# Patient Record
Sex: MALE | Race: WHITE | Employment: PART TIME | URBAN - METROPOLITAN AREA
[De-identification: names, ages, dates, MRNs, and addresses within clinical notes are randomized per-mention and may not be internally consistent; named-entity substitution may affect disease eponyms.]

---

## 2021-08-01 ENCOUNTER — HOSPITAL ENCOUNTER (EMERGENCY)
Age: 56
Discharge: HOME | End: 2021-08-01
Attending: EMERGENCY MEDICINE
Payer: MEDICARE

## 2021-08-01 ENCOUNTER — APPOINTMENT (OUTPATIENT)
Dept: CT IMAGING | Age: 56
End: 2021-08-01
Attending: NURSE PRACTITIONER
Payer: MEDICARE

## 2021-08-01 VITALS
DIASTOLIC BLOOD PRESSURE: 75 MMHG | TEMPERATURE: 98.4 F | HEIGHT: 72 IN | BODY MASS INDEX: 33.77 KG/M2 | HEART RATE: 78 BPM | WEIGHT: 249.34 LBS | SYSTOLIC BLOOD PRESSURE: 146 MMHG | RESPIRATION RATE: 16 BRPM | OXYGEN SATURATION: 99 %

## 2021-08-01 DIAGNOSIS — R53.1 WEAKNESS: Primary | ICD-10-CM

## 2021-08-01 LAB
ALBUMIN SERPL-MCNC: 3.6 G/DL (ref 3.5–5)
ALBUMIN/GLOB SERPL: 0.9 {RATIO} (ref 1.1–2.2)
ALP SERPL-CCNC: 88 U/L (ref 45–117)
ALT SERPL-CCNC: 79 U/L (ref 12–78)
ANION GAP SERPL CALC-SCNC: 6 MMOL/L (ref 5–15)
APPEARANCE UR: CLEAR
AST SERPL-CCNC: 82 U/L (ref 15–37)
ATRIAL RATE: 66 BPM
BACTERIA URNS QL MICRO: NEGATIVE /HPF
BASOPHILS # BLD: 0.1 K/UL (ref 0–0.1)
BASOPHILS NFR BLD: 1 % (ref 0–1)
BILIRUB SERPL-MCNC: 0.4 MG/DL (ref 0.2–1)
BILIRUB UR QL: NEGATIVE
BUN SERPL-MCNC: 13 MG/DL (ref 6–20)
BUN/CREAT SERPL: 15 (ref 12–20)
CALCIUM SERPL-MCNC: 9 MG/DL (ref 8.5–10.1)
CALCULATED P AXIS, ECG09: 71 DEGREES
CALCULATED R AXIS, ECG10: 49 DEGREES
CALCULATED T AXIS, ECG11: 43 DEGREES
CHLORIDE SERPL-SCNC: 104 MMOL/L (ref 97–108)
CO2 SERPL-SCNC: 27 MMOL/L (ref 21–32)
COLOR UR: NORMAL
CREAT SERPL-MCNC: 0.86 MG/DL (ref 0.7–1.3)
DIAGNOSIS, 93000: NORMAL
DIFFERENTIAL METHOD BLD: ABNORMAL
EOSINOPHIL # BLD: 0.3 K/UL (ref 0–0.4)
EOSINOPHIL NFR BLD: 3 % (ref 0–7)
EPITH CASTS URNS QL MICRO: NORMAL /LPF
ERYTHROCYTE [DISTWIDTH] IN BLOOD BY AUTOMATED COUNT: 14.6 % (ref 11.5–14.5)
ETHANOL SERPL-MCNC: <10 MG/DL
GLOBULIN SER CALC-MCNC: 4 G/DL (ref 2–4)
GLUCOSE SERPL-MCNC: 150 MG/DL (ref 65–100)
GLUCOSE UR STRIP.AUTO-MCNC: NEGATIVE MG/DL
HCT VFR BLD AUTO: 38.9 % (ref 36.6–50.3)
HGB BLD-MCNC: 12.9 G/DL (ref 12.1–17)
HGB UR QL STRIP: NEGATIVE
IMM GRANULOCYTES # BLD AUTO: 0 K/UL (ref 0–0.04)
IMM GRANULOCYTES NFR BLD AUTO: 0 % (ref 0–0.5)
KETONES UR QL STRIP.AUTO: NEGATIVE MG/DL
LEUKOCYTE ESTERASE UR QL STRIP.AUTO: NEGATIVE
LYMPHOCYTES # BLD: 1.8 K/UL (ref 0.8–3.5)
LYMPHOCYTES NFR BLD: 17 % (ref 12–49)
MAGNESIUM SERPL-MCNC: 2.2 MG/DL (ref 1.6–2.4)
MCH RBC QN AUTO: 30.5 PG (ref 26–34)
MCHC RBC AUTO-ENTMCNC: 33.2 G/DL (ref 30–36.5)
MCV RBC AUTO: 92 FL (ref 80–99)
MONOCYTES # BLD: 0.7 K/UL (ref 0–1)
MONOCYTES NFR BLD: 7 % (ref 5–13)
NEUTS SEG # BLD: 7.8 K/UL (ref 1.8–8)
NEUTS SEG NFR BLD: 72 % (ref 32–75)
NITRITE UR QL STRIP.AUTO: NEGATIVE
NRBC # BLD: 0 K/UL (ref 0–0.01)
NRBC BLD-RTO: 0 PER 100 WBC
P-R INTERVAL, ECG05: 180 MS
PH UR STRIP: 5.5 [PH] (ref 5–8)
PLATELET # BLD AUTO: 245 K/UL (ref 150–400)
PMV BLD AUTO: 10.1 FL (ref 8.9–12.9)
POTASSIUM SERPL-SCNC: 4 MMOL/L (ref 3.5–5.1)
PROT SERPL-MCNC: 7.6 G/DL (ref 6.4–8.2)
PROT UR STRIP-MCNC: NEGATIVE MG/DL
Q-T INTERVAL, ECG07: 432 MS
QRS DURATION, ECG06: 100 MS
QTC CALCULATION (BEZET), ECG08: 452 MS
RBC # BLD AUTO: 4.23 M/UL (ref 4.1–5.7)
RBC #/AREA URNS HPF: NORMAL /HPF (ref 0–5)
SODIUM SERPL-SCNC: 137 MMOL/L (ref 136–145)
SP GR UR REFRACTOMETRY: 1.02 (ref 1–1.03)
TROPONIN I SERPL-MCNC: <0.05 NG/ML
TROPONIN I SERPL-MCNC: <0.05 NG/ML
UR CULT HOLD, URHOLD: NORMAL
UROBILINOGEN UR QL STRIP.AUTO: 1 EU/DL (ref 0.2–1)
VENTRICULAR RATE, ECG03: 66 BPM
WBC # BLD AUTO: 10.8 K/UL (ref 4.1–11.1)
WBC URNS QL MICRO: NORMAL /HPF (ref 0–4)

## 2021-08-01 PROCEDURE — 85025 COMPLETE CBC W/AUTO DIFF WBC: CPT

## 2021-08-01 PROCEDURE — 81001 URINALYSIS AUTO W/SCOPE: CPT

## 2021-08-01 PROCEDURE — 83735 ASSAY OF MAGNESIUM: CPT

## 2021-08-01 PROCEDURE — 93005 ELECTROCARDIOGRAM TRACING: CPT

## 2021-08-01 PROCEDURE — 80053 COMPREHEN METABOLIC PANEL: CPT

## 2021-08-01 PROCEDURE — 36415 COLL VENOUS BLD VENIPUNCTURE: CPT

## 2021-08-01 PROCEDURE — 82077 ASSAY SPEC XCP UR&BREATH IA: CPT

## 2021-08-01 PROCEDURE — 70450 CT HEAD/BRAIN W/O DYE: CPT

## 2021-08-01 PROCEDURE — 99284 EMERGENCY DEPT VISIT MOD MDM: CPT

## 2021-08-01 PROCEDURE — 84484 ASSAY OF TROPONIN QUANT: CPT

## 2021-08-01 NOTE — DISCHARGE INSTRUCTIONS
Work-up in the emergency department including EKG, blood work, and CAT scan of your brain were reassuring. I do not know exactly what caused her episode of weakness earlier this evening but your symptoms seem to have resolved. Please take copy of all of your work-up to your doctor you are seeing on August 4th.      If you have recurrent episodes like you had today, or any new or worsening symptoms such as chest pain, difficulty breathing, headache, focal weakness or numbness, fever, etc. return to the emergency department

## 2021-08-01 NOTE — PROGRESS NOTES
12:49 AM  I have evaluated the patient as the Provider in Triage. I have reviewed His vital signs and the triage nurse assessment. I have talked with the patient and any available family and advised that I am the provider in triage and have ordered the appropriate study to initiate their work up based on the clinical presentation during my assessment. I have advised that the patient will be accommodated in the Main ED as soon as possible. I have also requested to contact the triage nurse or myself immediately if the patient experiences any changes in their condition during this brief waiting period. Hx of Bipolar and HTN     Pt states that he has unknown time where he started to have generalized weakness w/ excessive blinking after playing darts tonight. No difficulty walking, localized deficits, HA. Also states that he felt sweaty while playing darts tonight as well. Denies CP/ SOB/ difficulty breathing/ cough/ congestion/ fevers, or urinary concerns. Also notes that he has chronic back pain \"I take a pain pill for that\" and has back pain tonight. Denies any trauma/ injuries/ falls. Lives in an assisted living. Denies any ETOH/ substance abuse.      Ivon Hopkins NP

## 2021-08-01 NOTE — ED PROVIDER NOTES
HPI     31-year-old male with a history of bipolar disorder, diabetes, hypertension, high cholesterol, smoker (states he quit today) presents the emergency department with a 10 to 15-minute episode of feeling \"weak kneed\" patient states he got off work at 10 PM and walked to the local bar. He does not drink alcohol. He states he had an episode where he felt weak need but did not fall. He states he had slight increase in his chronic low back pain. He denies any associated chest pain, shortness of breath, nausea, dizziness or lightheadedness. No headache. He states he may have gotten a little sweaty. He denies any weakness or numbness in his arms or change in speech. He denies any vision changes such as double vision or loss of vision but states he was blinking more than normal.  He has not had any recent exertional chest pain or shortness of breath. He has not had any recent fever cough shortness of breath nausea vomiting, and is urinating normally. He has been vaccinated against Covid. He states right now he feels fine. He states he was able to walk to the wall wall and got himself a grape soda and he now feels better. He states his sugars have actually been running on the higher side. Past Medical History:   Diagnosis Date    [facility] ([facility]     Diabetes ([facility]     Hypertension        History reviewed. No pertinent surgical history. History reviewed. No pertinent family history.     Social History     Socioeconomic History    Marital status:      Spouse name: Not on file    Number of children: Not on file    Years of education: Not on file    Highest education level: Not on file   Occupational History    Not on file   Tobacco Use    Smoking status: Former Smoker   Substance and Sexual Activity    Alcohol use: Never    Drug use: Never    Sexual activity: Not on file   Other Topics Concern    Not on file   Social History Narrative    Not on file     Social Determinants of Health     Financial Resource Strain:     Difficulty of Paying Living Expenses:    Food Insecurity:     Worried About Running Out of Food in the Last Year:     [address] in the Last Year:    Transportation Needs:     Lack of Transportation (Medical):  Lack of Transportation (Non-Medical):    Physical Activity:     Days of Exercise per Week:     Minutes of Exercise per Session:    Stress:     Feeling of Stress :    Social Connections:     Frequency of Communication with Friends and Family:     Frequency of Social Gatherings with Friends and Family:     Attends Christianity Services:     Active Member of Clubs or Organizations:     Attends Club or Organization Meetings:     Marital Status:    Intimate Partner Violence:     Fear of Current or Ex-Partner:     Emotionally Abused:     Physically Abused:     Sexually Abused: ALLERGIES: Patient has no known allergies. Review of Systems   Constitutional: Negative for fever. HENT: Negative for congestion. Eyes: Negative for visual disturbance. Respiratory: Negative for cough and shortness of breath. Cardiovascular: Negative for chest pain. Gastrointestinal: Negative for abdominal pain, nausea and vomiting. Endocrine: Negative for polyuria. Genitourinary: Negative for dysuria. Musculoskeletal: Positive for back pain. Negative for gait problem. Neurological: Negative for headaches. Psychiatric/Behavioral: Negative for dysphoric mood. Vitals:    08/01/21 0054   BP: (!) 141/87   Pulse: 83   Resp: 16   Temp: 98.4 °F (36.9 °C)   SpO2: 98%   Weight: 113.1 kg (249 lb 5.4 oz)   Height: 6' (1.829 m)            Physical Exam  Constitutional:       General: He is not in acute distress. Appearance: He is well-developed. HENT:      Head: Normocephalic and atraumatic. Mouth/Throat:      Pharynx: No oropharyngeal exudate. Eyes:      General: No scleral icterus. Right eye: No discharge. Left eye: No discharge. Pupils: Pupils are equal, round, and reactive to light. Neck:      Vascular: No JVD. Cardiovascular:      Rate and Rhythm: Normal rate and regular rhythm. Heart sounds: Normal heart sounds. No murmur heard. Pulmonary:      Effort: Pulmonary effort is normal. No respiratory distress. Breath sounds: Normal breath sounds. No stridor. No wheezing or rales. Chest:      Chest wall: No tenderness. Abdominal:      General: Bowel sounds are normal. There is no distension. Palpations: Abdomen is soft. There is no mass. Tenderness: There is no abdominal tenderness. There is no guarding or rebound. Musculoskeletal:         General: Normal range of motion. Cervical back: Normal range of motion and neck supple. Skin:     General: Skin is warm and dry. Capillary Refill: Capillary refill takes less than 2 seconds. Findings: No rash. Neurological:      Mental Status: He is oriented to person, place, and time. Psychiatric:         Behavior: Behavior normal.         Thought Content: Thought content normal.         Judgment: Judgment normal.          MDM       Procedures    ED EKG interpretation:  Rhythm: normal sinus rhythm; and regular . Rate (approx.): 66; Axis: normal; P wave: normal; QRS interval: normal ; ST/T wave: non-specific changes; i. This EKG was interpreted by Gerardo Koch MD,ED Provider. Work-up is reassuring including serial troponins. Patient has been asymptomatic while he has been in the department. He states he has an appointment with a medical doctor on the fourth of this month and will take a copy of all of his results to review. Return precautions provided.

## 2021-08-01 NOTE — ED TRIAGE NOTES
Patient arrives to the ED via EMS with c/o dizziness, lethargy, an back pain, no nausea, vomiting or fever noted.

## 2021-12-02 ENCOUNTER — HOSPITAL ENCOUNTER (EMERGENCY)
Age: 56
Discharge: HOME | End: 2021-12-02
Attending: EMERGENCY MEDICINE
Payer: MEDICARE

## 2021-12-02 VITALS
RESPIRATION RATE: 20 BRPM | HEIGHT: 72 IN | DIASTOLIC BLOOD PRESSURE: 83 MMHG | OXYGEN SATURATION: 98 % | HEART RATE: 78 BPM | BODY MASS INDEX: 29.8 KG/M2 | WEIGHT: 220 LBS | TEMPERATURE: 98.4 F | SYSTOLIC BLOOD PRESSURE: 149 MMHG

## 2021-12-02 DIAGNOSIS — T69.9XXA COLD EXPOSURE, INITIAL ENCOUNTER: ICD-10-CM

## 2021-12-02 DIAGNOSIS — Z59.00 HOMELESSNESS: Primary | ICD-10-CM

## 2021-12-02 PROCEDURE — 99283 EMERGENCY DEPT VISIT LOW MDM: CPT

## 2021-12-02 SDOH — ECONOMIC STABILITY - HOUSING INSECURITY: HOMELESSNESS UNSPECIFIED: Z59.00

## 2021-12-02 NOTE — ED PROVIDER NOTES
EMERGENCY DEPARTMENT HISTORY AND PHYSICAL EXAM     ------------------------------------------------------------------------------------------------------  Please note that this dictation was completed with [employer], the [employer] voice recognition software. Quite often unanticipated grammatical, syntax, homophones, and other interpretive errors are inadvertently transcribed by the computer software. Please disregard these errors. Please excuse any errors that have escaped final proofreading.  -----------------------------------------------------------------------------------------------------------------    Date: 12/2/2021  Patient Name: Leticia Mac    History of Presenting Illness     Chief Complaint   Patient presents with    Cold exposure     Patient arrives w EMS, after reportedly being outside for 4 days, he was found to be hypothermic. History Provided By: Patient, EMS    HPI: Leticia Mac is a 49 y.o. male, with significant pmhx of bipolar disorder, hypertension, diabetes, who presents via EMS to the ED with c/o \"I am homeless and hungry and thus the reason I am here. \"    Patient reported to EMS that he been outside for the last 4 days with near piercing temperatures. Requested to come the emergency department for further evaluation. Patient was noted to have temperature of 96.7 by external measures by EMS. Noted to be 98.4 by oral thermometer. Has no complaints of pain, shortness of breath or other issues. Blood sugar with EMS in the 200s. PCP: None    Social Hx: denies tobacco, denies EtOH, denies recreational/ Illicit Drugs     There are no other complaints, changes, or physical findings at this time. No Known Allergies          Past History     Past Medical History:  Past Medical History:   Diagnosis Date    [facility] 1 disorder ([facility]     Diabetes ([facility]     Hypertension        Past Surgical History:  No past surgical history on file.     Family History:  No family history on file.    Social History:  Social History     Tobacco Use    Smoking status: Former Smoker    Smokeless tobacco: Not on file   Substance Use Topics    Alcohol use: Never    Drug use: Never       Allergies:  No Known Allergies      Review of Systems   Review of Systems   Constitutional: Negative for chills and fever. HENT: Negative. Eyes: Negative. Respiratory: Negative for cough, chest tightness and shortness of breath. Cardiovascular: Negative for chest pain and leg swelling. Gastrointestinal: Negative for abdominal pain, diarrhea, nausea and vomiting. Endocrine: Negative. Genitourinary: Negative for difficulty urinating and dysuria. Musculoskeletal: Negative for myalgias. Skin: Negative. Neurological: Negative. Psychiatric/Behavioral: Negative. All other systems reviewed and are negative. Physical Exam   Physical Exam  Vitals and nursing note reviewed. HENT:      Head: Normocephalic and atraumatic. Nose: No nasal deformity. Mouth/Throat:      Lips: No lesions. Eyes:      Conjunctiva/sclera: Conjunctivae normal.   Cardiovascular:      Rate and Rhythm: Normal rate. Pulmonary:      Effort: Pulmonary effort is normal.   Musculoskeletal:         General: Normal range of motion. Cervical back: Normal range of motion. No pain with movement. Right lower leg: No edema. Left lower leg: No edema. Skin:     General: Skin is dry. Findings: No rash. Neurological:      General: No focal deficit present. Mental Status: He is alert. Psychiatric:         Mood and Affect: Mood normal.           Diagnostic Study Results     Labs -   No results found for this or any previous visit (from the past 12 hour(s)). Radiologic Studies -   No orders to display     CT Results  (Last 48 hours)    None        CXR Results  (Last 48 hours)    None            Medical Decision Making   I am the first provider for this patient.     I reviewed the vital signs, available nursing notes, past medical history, past surgical history, family history and social history. Vital Signs-Reviewed the patient's vital signs. Patient Vitals for the past 12 hrs:   Temp Pulse Resp BP SpO2   12/02/21 0503 98.4 °F (36.9 °C)       12/02/21 0421 (!) 96.7 °F (35.9 °C) 78 20 (!) 149/83 98 %       Pulse Oximetry Analysis - 98% on RA Normal    Records Reviewed/Interpretted: Nursing Notes from triage and Old Medical Records, noting previous ER visit in August 2021 for generalized    Provider Notes (Medical Decision Making):     DDX:  Homelessness    Plan:  Provide meal    Impression:  Homelessness    ED Course:   Initial assessment performed. The patients presenting problems have been discussed, and they are in agreement with the care plan formulated and outlined with them. I have encouraged them to ask questions as they arise throughout their visit. I reviewed our electronic medical record system for any past medical records that were available that may contribute to the patients current condition, the nursing notes and and vital signs from today's visit  Nursing notes will be reviewed as they become available in realtime while the pt has been in the ED. Alonso Alonso MD      5:24 AM  Progress note:  Pt will follow up with mental health resources as instructed. All questions have been answered, pt voiced understanding and agreement with plan. Specific return precautions provided in addition to instructions for pt to return to the ED immediately should sx worsen at any time. Alonso Alonso MD             Critical Care Time:     none      Diagnosis     Clinical Impression:   1. Homelessness    2. Cold exposure, initial encounter        PLAN:  1. There are no discharge medications for this patient.     2.   Follow-up Information     Follow up With Specialties Details Why Contact Info    [facility] EMERGENCY DEPT Emergency Medicine  As needed [address] [facility]  [address]   [address] [address]  [phone]        Return to ED if worse     Disposition:    5:25 AM   The patient's results have been reviewed with family and/or caregiver. They verbally convey their understanding and agreement of the patient's signs, symptoms, diagnosis, treatment and prognosis and additionally agree to follow up as recommended in the discharge instructions or to return to the Emergency Room should the patient's condition change prior to their follow-up appointment. The family and/or caregiver verbally agrees with the care-plan and all of their questions have been answered. The discharge instructions have also been provided to the them with educational information regarding the patient's diagnosis as well a list of reasons why the patient would want to return to the ER prior to their follow-up appointment should their condition change.   Caitlyn Lui MD

## 2021-12-02 NOTE — DISCHARGE INSTRUCTIONS
[address]:  [facility]  [facility] 85  [address]      [facility] 37       [phone]      Accepts Insured Patients Only:  [facility]  [address]       [phone]  Near the corner of [facility] and [address] in the near [facility]. Accepts most insurance including Medicaid/Medicare. No psychiatry. On the [facility] bus line. [address] most major insurances. Psychiatry available. Some DBT groups. [address] [facility] [phone]  Indy Baez, [facility] ([address] [facility])  Jyoti Wellington [facility] ([address] [place] [address])  Floydene Curling, LCSW ([facility])    [address] [facility] ([address]  [facility] ([address] [place] [address])    [address] [facility], [address] [place])     [phone]   Michelle Santiago, [address] [facility] [address] (Trauma)  Warren Winston ([address])    [facility])    [phone]   Mixture of psychologists and psychiatrists. They do not accept Medicaid or Medicare. The [facility] Group  [address] [place] [address]       [phone]   Mixture of clinical social workers and psychologists.     [address] [facility]        [phone]  [address] [facility], [address] [place] [address] Counseling and Treatment  (Clinicians: Wayne Salgado LCSW and CLARICE Meza both specialize in Trauma)  [address], 869 [facility]        [phone]     Donato Palaf[address] 40 [address] [place] [address], [address]         [address], [address] [facility], [address]  [place], [address] [facility]        [address], 535 [facility], 2[address] [facility], [address]        [address] Counseling  [address] [facility], [address]        [address] [facility] [place] psychologists practice here)  VINNY Renee [address], [address]  [place], [address] [facility]        [phone]    Sliding Scale/Financial Aid/Differing Payment Options  [facility] [address] [facility]      [phone]   Variety of treatment options, including DBT. [place] [address] [facility]       [phone]   Provides a variety of group and individual counseling options. Insurance, Medicaid, Medicare and sliding scale    [facility], [address]      [phone]    [address] [facility] Psychological Services and Development ([facility])  [address] [place], [address]    [phone]  Training clinic for [place] in Psychology; [facility] also carry some cases as well. Director: Medhat Seth, Ph.D., LCP, [facility] (* She specializes in Anxiety; Child & Adol. Mental health)      Medicaid/Medicare providers in the [address]       [phone]    [facility]         [address]       [phone]    [place]  [address], [address]    [phone] ex. [facility], [address]      [phone]    [address] [place] [address]     [phone]  N.  [facility], [address] [place] [address], [address]    1850 [facility], [address] [place] [address] [facility] Main Office    [phone]  [address] [facility] [address] [place]    Intensive Community Outreach Services (ICOS)  Call ahead for appointment time  [address] [facility]     [phone]    [address]      [facility] [address]    1 [facility]      [address] 19 [facility] Estela Desai [address])   [phone]      Services for patients without Medicaid, Medicare or Insurance  The  [address]       [phone]   See handout in separate folder    [address] (formerly [facility]) on-site, psychiatry, AA meetings, counseling and social workers  [place]town: AlexBeth Felixfar[address] [phone]    [address] [place] [address] [place]:  [facility] [facility] [address] [phone]      Medication Assisted Treatment (MAT) Programs  The purpose of Medication Assisted Treatment (MAT) programs is to provide quality treatment for individuals living with Substance Use Disorder. We understand that after patients undergo a detox, some may need MAT treatment services to provide additional assistance on their road to recovery. When it comes to medication assisted treatment, Suboxone, Buprenorphine, and other drugs can serve as a valuable resource during recovery. We know that making such a significant change is difficult for patients and want to do everything we can to make the addiction treatment process as smooth and comfortable as possible. There are several locations around [place]; many can offer same day or next day appointments. Please call      [facility] ([address])  [address] [place] [phone]  Standing [phone] [place] y ED referral appointment 10:00-11:00 Tuesday - Friday    [facility])   [address] [facility] 11 [phone]  Standing [address] [place] ED referral appointment 10:00-11:00 Monday    [facility] [place])   [address] [facility]  [phone]  Call or email Olvin Holcomb: [phone], [email or web address]  To schedule an appointment    The [facility] Addiction Medicine  [address] [facility] 6, [address]  for Recovery  Padmini Campbell 70..   [place], [place] 310 [facility]  [address] [facility] [address], [address]  [address] [place] [address] Luis Alfredo Peguero, [address]  [address] [facility] [address] ([employer])  1516 [facility], [address]  [phone]    Recovery from Addiction is possible!

## 2021-12-07 ENCOUNTER — HOSPITAL ENCOUNTER (EMERGENCY)
Age: 56
Discharge: HOME | End: 2021-12-07
Attending: EMERGENCY MEDICINE
Payer: MEDICARE

## 2021-12-07 ENCOUNTER — APPOINTMENT (OUTPATIENT)
Dept: CT IMAGING | Age: 56
End: 2021-12-07
Attending: EMERGENCY MEDICINE
Payer: MEDICARE

## 2021-12-07 VITALS
TEMPERATURE: 97.6 F | HEART RATE: 77 BPM | HEIGHT: 69 IN | OXYGEN SATURATION: 97 % | DIASTOLIC BLOOD PRESSURE: 107 MMHG | RESPIRATION RATE: 16 BRPM | BODY MASS INDEX: 30.36 KG/M2 | SYSTOLIC BLOOD PRESSURE: 189 MMHG | WEIGHT: 205 LBS
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
## 2021-12-07 DIAGNOSIS — S09.90XA CLOSED HEAD INJURY, INITIAL ENCOUNTER: Primary | ICD-10-CM

## 2021-12-07 LAB
ALBUMIN SERPL-MCNC: 3.2 G/DL (ref 3.5–5)
ALBUMIN/GLOB SERPL: 0.8 {RATIO} (ref 1.1–2.2)
ALP SERPL-CCNC: 85 U/L (ref 45–117)
ALT SERPL-CCNC: 37 U/L (ref 12–78)
AMPHET UR QL SCN: NEGATIVE
ANION GAP SERPL CALC-SCNC: 7 MMOL/L (ref 5–15)
APPEARANCE UR: CLEAR
AST SERPL-CCNC: 28 U/L (ref 15–37)
ATRIAL RATE: 56 BPM
BACTERIA URNS QL MICRO: NEGATIVE /HPF
BARBITURATES UR QL SCN: NEGATIVE
BASOPHILS # BLD: 0 K/UL (ref 0–0.1)
BASOPHILS NFR BLD: 0 % (ref 0–1)
BENZODIAZ UR QL: NEGATIVE
BILIRUB SERPL-MCNC: 0.3 MG/DL (ref 0.2–1)
BILIRUB UR QL: NEGATIVE
BUN SERPL-MCNC: 18 MG/DL (ref 6–20)
BUN/CREAT SERPL: 27 (ref 12–20)
CALCIUM SERPL-MCNC: 8.9 MG/DL (ref 8.5–10.1)
CALCULATED P AXIS, ECG09: 68 DEGREES
CALCULATED R AXIS, ECG10: 79 DEGREES
CALCULATED T AXIS, ECG11: 15 DEGREES
CANNABINOIDS UR QL SCN: POSITIVE
CHLORIDE SERPL-SCNC: 105 MMOL/L (ref 97–108)
CO2 SERPL-SCNC: 28 MMOL/L (ref 21–32)
COCAINE UR QL SCN: NEGATIVE
COLOR UR: NORMAL
COVID-19 RAPID TEST, COVR: NOT DETECTED
CREAT SERPL-MCNC: 0.67 MG/DL (ref 0.7–1.3)
DIAGNOSIS, 93000: NORMAL
DIFFERENTIAL METHOD BLD: ABNORMAL
DRUG SCRN COMMENT,DRGCM: ABNORMAL
EOSINOPHIL # BLD: 0.2 K/UL (ref 0–0.4)
EOSINOPHIL NFR BLD: 2 % (ref 0–7)
EPITH CASTS URNS QL MICRO: NORMAL /LPF
ERYTHROCYTE [DISTWIDTH] IN BLOOD BY AUTOMATED COUNT: 14.7 % (ref 11.5–14.5)
ETHANOL SERPL-MCNC: <10 MG/DL
GLOBULIN SER CALC-MCNC: 4.1 G/DL (ref 2–4)
GLUCOSE SERPL-MCNC: 175 MG/DL (ref 65–100)
GLUCOSE UR STRIP.AUTO-MCNC: NEGATIVE MG/DL
HCT VFR BLD AUTO: 42.9 % (ref 36.6–50.3)
HGB BLD-MCNC: 14 G/DL (ref 12.1–17)
HGB UR QL STRIP: NEGATIVE
HYALINE CASTS URNS QL MICRO: NORMAL /LPF (ref 0–5)
IMM GRANULOCYTES # BLD AUTO: 0 K/UL (ref 0–0.04)
IMM GRANULOCYTES NFR BLD AUTO: 0 % (ref 0–0.5)
INR PPP: 1 (ref 0.9–1.1)
KETONES UR QL STRIP.AUTO: NEGATIVE MG/DL
LEUKOCYTE ESTERASE UR QL STRIP.AUTO: NEGATIVE
LYMPHOCYTES # BLD: 1.4 K/UL (ref 0.8–3.5)
LYMPHOCYTES NFR BLD: 14 % (ref 12–49)
MAGNESIUM SERPL-MCNC: 2.1 MG/DL (ref 1.6–2.4)
MCH RBC QN AUTO: 30.3 PG (ref 26–34)
MCHC RBC AUTO-ENTMCNC: 32.6 G/DL (ref 30–36.5)
MCV RBC AUTO: 92.9 FL (ref 80–99)
METHADONE UR QL: NEGATIVE
MONOCYTES # BLD: 0.6 K/UL (ref 0–1)
MONOCYTES NFR BLD: 6 % (ref 5–13)
NEUTS SEG # BLD: 7.4 K/UL (ref 1.8–8)
NEUTS SEG NFR BLD: 78 % (ref 32–75)
NITRITE UR QL STRIP.AUTO: NEGATIVE
NRBC # BLD: 0 K/UL (ref 0–0.01)
NRBC BLD-RTO: 0 PER 100 WBC
OPIATES UR QL: NEGATIVE
P-R INTERVAL, ECG05: 162 MS
PCP UR QL: NEGATIVE
PH UR STRIP: 6.5 [PH] (ref 5–8)
PLATELET # BLD AUTO: 164 K/UL (ref 150–400)
PMV BLD AUTO: 10.6 FL (ref 8.9–12.9)
POTASSIUM SERPL-SCNC: 3.8 MMOL/L (ref 3.5–5.1)
PROT SERPL-MCNC: 7.3 G/DL (ref 6.4–8.2)
PROT UR STRIP-MCNC: NEGATIVE MG/DL
PROTHROMBIN TIME: 10.5 SEC (ref 9–11.1)
Q-T INTERVAL, ECG07: 430 MS
QRS DURATION, ECG06: 96 MS
QTC CALCULATION (BEZET), ECG08: 414 MS
RBC # BLD AUTO: 4.62 M/UL (ref 4.1–5.7)
RBC #/AREA URNS HPF: NORMAL /HPF (ref 0–5)
SARS-COV-2, COV2: NORMAL
SODIUM SERPL-SCNC: 140 MMOL/L (ref 136–145)
SOURCE, COVRS: NORMAL
SP GR UR REFRACTOMETRY: 1.01 (ref 1–1.03)
UA: UC IF INDICATED,UAUC: NORMAL
UROBILINOGEN UR QL STRIP.AUTO: 1 EU/DL (ref 0.2–1)
VENTRICULAR RATE, ECG03: 56 BPM
WBC # BLD AUTO: 9.6 K/UL (ref 4.1–11.1)
WBC URNS QL MICRO: NORMAL /HPF (ref 0–4)

## 2021-12-07 PROCEDURE — 80307 DRUG TEST PRSMV CHEM ANLYZR: CPT

## 2021-12-07 PROCEDURE — 96365 THER/PROPH/DIAG IV INF INIT: CPT

## 2021-12-07 PROCEDURE — 99285 EMERGENCY DEPT VISIT HI MDM: CPT

## 2021-12-07 PROCEDURE — U0005 INFEC AGEN DETEC AMPLI PROBE: HCPCS

## 2021-12-07 PROCEDURE — 74011250637 HC RX REV CODE- 250/637: Performed by: EMERGENCY MEDICINE

## 2021-12-07 PROCEDURE — 85610 PROTHROMBIN TIME: CPT

## 2021-12-07 PROCEDURE — 74011000258 HC RX REV CODE- 258: Performed by: EMERGENCY MEDICINE

## 2021-12-07 PROCEDURE — 85025 COMPLETE CBC W/AUTO DIFF WBC: CPT

## 2021-12-07 PROCEDURE — 36415 COLL VENOUS BLD VENIPUNCTURE: CPT

## 2021-12-07 PROCEDURE — 80053 COMPREHEN METABOLIC PANEL: CPT

## 2021-12-07 PROCEDURE — 74011000250 HC RX REV CODE- 250: Performed by: EMERGENCY MEDICINE

## 2021-12-07 PROCEDURE — 81001 URINALYSIS AUTO W/SCOPE: CPT

## 2021-12-07 PROCEDURE — 83735 ASSAY OF MAGNESIUM: CPT

## 2021-12-07 PROCEDURE — 93005 ELECTROCARDIOGRAM TRACING: CPT

## 2021-12-07 PROCEDURE — 74011250636 HC RX REV CODE- 250/636: Performed by: EMERGENCY MEDICINE

## 2021-12-07 PROCEDURE — 82077 ASSAY SPEC XCP UR&BREATH IA: CPT

## 2021-12-07 PROCEDURE — 70450 CT HEAD/BRAIN W/O DYE: CPT

## 2021-12-07 PROCEDURE — 87635 SARS-COV-2 COVID-19 AMP PRB: CPT

## 2021-12-07 RX ORDER — FOLIC ACID 5 MG/ML
1 INJECTION, SOLUTION INTRAMUSCULAR; INTRAVENOUS; SUBCUTANEOUS
Status: DISCONTINUED | OUTPATIENT
Start: 2021-12-07 | End: 2021-12-07

## 2021-12-07 RX ORDER — ASPIRIN 325 MG/1
100 TABLET, FILM COATED ORAL DAILY
Status: DISCONTINUED | OUTPATIENT
Start: 2021-12-07 | End: 2021-12-07 | Stop reason: HOSPADM

## 2021-12-07 RX ADMIN — FOLIC ACID: 5 INJECTION, SOLUTION INTRAMUSCULAR; INTRAVENOUS; SUBCUTANEOUS at 08:36

## 2021-12-07 RX ADMIN — SODIUM CHLORIDE 1000 ML: 9 INJECTION, SOLUTION INTRAVENOUS at 08:11

## 2021-12-07 RX ADMIN — THIAMINE HCL TAB 100 MG 100 MG: 100 TAB at 08:11

## 2021-12-07 NOTE — ED NOTES
Pt arrives via EMS as he was found asleep in a resident's yard. Pt reporting that he would like treatment for alcohol and drug addiction, last drink was 3 days ago/used crack 2 days ago. Pt says he is apart of [address] [place] [address] who administers medication for depression and HTN. Pt reporting he has been homeless x 5 days. Unable to obtain oral/axillary temperature at this time - pt provided blankets     Pt has a healing abrasion above R eye - says he fell about 1 day ago and \"cracked his head on asphalt\"     MD at bedside. Per MD, pt okay to eat/drink - pt provided pepsi/crackers/apple sauce     0830 Pt provided urinal     0915 Unable to obtain oral/axillary temperature - pt provided multiple warm blankets, will reassess.      1010 Temperature obtained, pt sitting at edge of stretcher, safety education provided and pt to use call bell for ambulation needs

## 2021-12-07 NOTE — PROGRESS NOTES
11:41 AM  Pt has completed intake process with the [facility] and has reported to RN that he does not want to pursue this option at this time Pt has been provided with [address] [facility], [facility] for [facility], [employer], and outpatient substance abuse resources. 10:25 AM  CM called [facility] who states that pt has been discharged from their program and cannot return. Pt at this time is requesting treatment resources, CM provided [employer] as pt states he is homeless. CM discussed with RN about the [facility], RN states that this is where pt would like to go. Pt will need transportation. CM has provided RN with the [facility] phone number. Pt will need to call for intake assessment.     Alanis Telles [address], [address]

## 2021-12-07 NOTE — PROGRESS NOTES
CM met with patient at the bedside to discuss where patient would like  to send him at discharge. Patient asked to be sent to [facility];CM explained that CM cannot arrange transport that far. Patient asked to be sent to truck stop in [place]. CM arranged for roundtrip for patient to [address] [place] [address], [address] Patient discharged to waiting room with discharge instructions.        Costa Garcia, SALUDN, RN, BSW   RN Care Manager

## 2021-12-08 LAB
SARS-COV-2, XPLCVT: NOT DETECTED
SOURCE, COVRS: NORMAL

## 2021-12-08 NOTE — ED PROVIDER NOTES
EMERGENCY DEPARTMENT HISTORY AND PHYSICAL EXAM      Date: 12/7/2021  Patient Name: Laura Díaz    History of Presenting Illness     Chief Complaint   Patient presents with    Alcohol Problem     Pt arrives via EMS as he was found asleep in a resident's yard. Pt reporting that he would like treatment for alcohol and drug addiction, last drink was 3 days ago/used crack 2 days ago. Pt says he is apart of [address] [place] [address] who administers medication for depression and HTN. History Provided By: Patient and EMS    HPI: Laura Díaz, 49 y.o. male presents to the ED with cc of sobriety issues. Pt had been found down in a yard this morning. He states he does not know where he was or how he had gotten there but dies remember falling and hitting his head the previous day. He states he has alcohol and drug issues. Last drink was three days ago. He states he does use THC, and smoked crack 2 days ago. He states he would like help with his addictions and finding recovery program. He states he was a [address] but wants nothing to do with them and left. He states cab to right forehead from when he hit it a day ago but does no remember how that happened. He does not know how long he had been laying in the yard or how he got there. He currently has no complaints. He denies headache. He denies any neck or back pain. He denies recent fever or chills. He denies any CP or SOA. He denies abd pain, n/v/d. There had been no incontinence of bowel or bladder. There are no other complaints, changes, or physical findings at this time. PCP: None    No current facility-administered medications on file prior to encounter. No current outpatient medications on file prior to encounter.        Past History     Past Medical History:  Past Medical History:   Diagnosis Date    [facility] ([facility]     Diabetes ([facility]     Hypertension    Alcohol dependence  Polysubstance use     Past Surgical History:  None    Family History:  Non-contributory     Social History:  Social History     Tobacco Use    Smoking status: Former Smoker    Smokeless tobacco: Not on file   Substance Use Topics    Alcohol use: Hx of dependence last drink 3 days ago    Drug use: THC, Crack       Allergies:  No Known Allergies      Review of Systems   Review of Systems   Constitutional: Negative. Negative for appetite change, chills, fatigue and fever. HENT: Negative for congestion, rhinorrhea, sinus pressure and sore throat. Head injury     Eyes: Negative. Respiratory: Negative. Negative for cough, choking, chest tightness, shortness of breath and wheezing. Cardiovascular: Negative. Negative for chest pain, palpitations and leg swelling. Gastrointestinal: Negative for abdominal pain, constipation, diarrhea, nausea and vomiting. Endocrine: Negative. Genitourinary: Negative. Negative for difficulty urinating, dysuria, flank pain and urgency. Musculoskeletal: Negative. Skin: Negative. Neurological: Negative. Negative for dizziness, speech difficulty, weakness, light-headedness, numbness and headaches. Psychiatric/Behavioral:        Hx of depression, alcohol and polysubstance use and dependence   All other systems reviewed and are negative. Physical Exam   Physical Exam  Vitals and nursing note reviewed. Constitutional:       General: He is not in acute distress. Appearance: He is well-developed. He is not diaphoretic. Comments: Disheveled, dirty clothes   HENT:      Head: Normocephalic. Comments: Abrasion to right forehead        Mouth/Throat:      Mouth: Mucous membranes are dry. Pharynx: No oropharyngeal exudate. Eyes:      General: No scleral icterus. Extraocular Movements: Extraocular movements intact. Conjunctiva/sclera: Conjunctivae normal.      Pupils: Pupils are equal, round, and reactive to light. Neck:      Vascular: No JVD.       Trachea: No tracheal deviation. Cardiovascular:      Rate and Rhythm: Normal rate and regular rhythm. Heart sounds: Normal heart sounds. No murmur heard. Pulmonary:      Effort: Pulmonary effort is normal. No respiratory distress. Breath sounds: Normal breath sounds. No stridor. No wheezing or rales. Chest:      Chest wall: No tenderness. Abdominal:      General: There is no distension. Palpations: Abdomen is soft. Tenderness: There is no abdominal tenderness. There is no guarding or rebound. Musculoskeletal:         General: No tenderness. Normal range of motion. Cervical back: Normal range of motion and neck supple. No rigidity or tenderness. Right lower leg: No edema. Left lower leg: No edema. Skin:     General: Skin is warm and dry. Capillary Refill: Capillary refill takes less than 2 seconds. Neurological:      Mental Status: He is alert and oriented to person, place, and time. Cranial Nerves: No cranial nerve deficit. Comments: No gross motor or sensory deficits    Psychiatric:         Behavior: Behavior normal.      Comments: Inappropriate humor at times, no SI/HI, hx of polysubstance use and dependence            Diagnostic Study Results     Labs -     Recent Results (from the past 12 hour(s))   CBC WITH AUTOMATED DIFF    Collection Time: 12/07/21  7:41 AM   Result Value Ref Range    WBC 9.6 4.1 - 11.1 K/uL    RBC 4.62 4.10 - 5.70 M/uL    HGB 14.0 12.1 - 17.0 g/dL    HCT 42.9 36.6 - 50.3 %    MCV 92.9 80.0 - 99.0 FL    MCH 30.3 26.0 - 34.0 PG    MCHC 32.6 30.0 - 36.5 g/dL    RDW 14.7 (H) 11.5 - 14.5 %    PLATELET 208 835 - 899 K/uL    MPV 10.6 8.9 - 12.9 FL    NRBC 0.0 0  WBC    ABSOLUTE NRBC 0.00 0.00 - 0.01 K/uL    NEUTROPHILS 78 (H) 32 - 75 %    LYMPHOCYTES 14 12 - 49 %    MONOCYTES 6 5 - 13 %    EOSINOPHILS 2 0 - 7 %    BASOPHILS 0 0 - 1 %    IMMATURE GRANULOCYTES 0 0.0 - 0.5 %    ABS. NEUTROPHILS 7.4 1.8 - 8.0 K/UL    ABS.  LYMPHOCYTES 1.4 0.8 - 3.5 K/UL    ABS. MONOCYTES 0.6 0.0 - 1.0 K/UL    ABS. EOSINOPHILS 0.2 0.0 - 0.4 K/UL    ABS. BASOPHILS 0.0 0.0 - 0.1 K/UL    ABS. IMM. GRANS. 0.0 0.00 - 0.04 K/UL    DF AUTOMATED     METABOLIC PANEL, COMPREHENSIVE    Collection Time: 12/07/21  7:41 AM   Result Value Ref Range    Sodium 140 136 - 145 mmol/L    Potassium 3.8 3.5 - 5.1 mmol/L    Chloride 105 97 - 108 mmol/L    CO2 28 21 - 32 mmol/L    Anion gap 7 5 - 15 mmol/L    Glucose 175 (H) 65 - 100 mg/dL    BUN 18 6 - 20 MG/DL    Creatinine 0.67 (L) 0.70 - 1.30 MG/DL    BUN/Creatinine ratio 27 (H) 12 - 20      GFR est AA >60 >60 ml/min/1.73m2    GFR est non-AA >60 >60 ml/min/1.73m2    Calcium 8.9 8.5 - 10.1 MG/DL    Bilirubin, total 0.3 0.2 - 1.0 MG/DL    ALT (SGPT) 37 12 - 78 U/L    AST (SGOT) 28 15 - 37 U/L    Alk.  phosphatase 85 45 - 117 U/L    Protein, total 7.3 6.4 - 8.2 g/dL    Albumin 3.2 (L) 3.5 - 5.0 g/dL    Globulin 4.1 (H) 2.0 - 4.0 g/dL    A-G Ratio 0.8 (L) 1.1 - 2.2     PROTHROMBIN TIME + INR    Collection Time: 12/07/21  7:41 AM   Result Value Ref Range    INR 1.0 0.9 - 1.1      Prothrombin time 10.5 9.0 - 11.1 sec   MAGNESIUM    Collection Time: 12/07/21  7:41 AM   Result Value Ref Range    Magnesium 2.1 1.6 - 2.4 mg/dL   ETHYL ALCOHOL    Collection Time: 12/07/21  7:41 AM   Result Value Ref Range    ALCOHOL(ETHYL),SERUM <10 <10 MG/DL   EKG, 12 LEAD, INITIAL    Collection Time: 12/07/21  8:06 AM   Result Value Ref Range    Ventricular Rate 56 BPM    Atrial Rate 56 BPM    P-R Interval 162 ms    QRS Duration 96 ms    Q-T Interval 430 ms    QTC Calculation (Bezet) 414 ms    Calculated P Axis 68 degrees    Calculated R Axis 79 degrees    Calculated T Axis 15 degrees    Diagnosis       Sinus bradycardia with premature atrial complexes  Incomplete right bundle branch block  Nonspecific T wave abnormality  When compared with ECG of 01-AUG-2021 01:45,  premature atrial complexes are now present  Incomplete right bundle branch block is now present SARS-COV-2    Collection Time: 12/07/21  8:13 AM   Result Value Ref Range    SARS-CoV-2 Please find results under separate order     URINALYSIS W/ REFLEX CULTURE    Collection Time: 12/07/21  9:19 AM    Specimen: Urine   Result Value Ref Range    Color YELLOW/STRAW      Appearance CLEAR CLEAR      Specific gravity 1.015 1.003 - 1.030      pH (UA) 6.5 5.0 - 8.0      Protein Negative NEG mg/dL    Glucose Negative NEG mg/dL    Ketone Negative NEG mg/dL    Bilirubin Negative NEG      Blood Negative NEG      Urobilinogen 1.0 0.2 - 1.0 EU/dL    Nitrites Negative NEG      Leukocyte Esterase Negative NEG      WBC 0-4 0 - 4 /hpf    RBC 0-5 0 - 5 /hpf    Epithelial cells FEW FEW /lpf    Bacteria Negative NEG /hpf    UA:UC IF INDICATED CULTURE NOT INDICATED BY UA RESULT CNI      Hyaline cast 0-2 0 - 5 /lpf   DRUG SCREEN, URINE    Collection Time: 12/07/21  9:19 AM   Result Value Ref Range    AMPHETAMINES Negative NEG      BARBITURATES Negative NEG      BENZODIAZEPINES Negative NEG      COCAINE Negative NEG      METHADONE Negative NEG      OPIATES Negative NEG      PCP(PHENCYCLIDINE) Negative NEG      THC (TH-CANNABINOL) Positive (A) NEG      Drug screen comment (NOTE)    COVID-19 RAPID TEST    Collection Time: 12/07/21 10:41 AM   Result Value Ref Range    Specimen source Nasopharyngeal      COVID-19 rapid test Not detected NOTD         Radiologic Studies -   CT HEAD WO CONT   Final Result        CT Results  (Last 48 hours)               12/07/21 0812  CT HEAD WO CONT Final result    Impression:  No acute intracranial findings. Narrative:  Clinical indication: Fall, dizziness. Axial CT scan of the brain obtained without intravenous contrast.    Comparison August 1, 2021. CT dose reduction was achieved through the use of a   standardized protocol tailored for this examination and automatic exposure   control for dose modulation . There is no extra-axial fluid collection. hemorrhage or shift. No masses. CXR Results  (Last 48 hours)    None          Medical Decision Making   I am the first provider for this patient. I reviewed the vital signs, available nursing notes, past medical history, past surgical history, family history and social history. Vital Signs-Reviewed the patient's vital signs. Patient Vitals for the past 12 hrs:   Temp Pulse Resp BP SpO2   12/07/21 1007 97.6 °F (36.4 °C)       12/07/21 0732  77 16 (!) 189/107 97 %       EKG interpretation: (Preliminary)  Sinus joelle, rate 56, normal axis/pr/qrs, no acute ST changes, Casi Douse, DO      Records Reviewed: Nursing Notes, Old Medical Records, Ambulance Run Sheet, Previous Radiology Studies and Previous Laboratory Studies, seen 12/2 for Cold exposure, homelessness, seen at [facility] 8/21 Wellness check Hepatitis and HIV Screen    Provider Notes (Medical Decision Making):   DDx- Substance use disorder, dehydration, electrolyte abnormality, cold exposure, homelessness    ED Course:   Initial assessment performed. The patients presenting problems have been discussed, and they are in agreement with the care plan formulated and outlined with them. I have encouraged them to ask questions as they arise throughout their visit. Given hx will order med clearance labs, IV fluids, Thiamine PO and IV Folic acid. Labs unremarkable. CT head neg. UDS + for THC. Discussed with CM who reached out pt's last known address. Pt at assisted living facility in [facility] under [employer]. Pt had a  but was dc'd from their program and is currently homeless. Pt voiced that he wanted to get clean. [facility], discussed with pt he would need to make phone call for intake, he has been medically cleared and we could help arrange transportation for him.  Per nurse pt while talking with intake became angered by having to give them this information as he has completed intake before and stated he wasn't going there and wanted to be dc'd. He had been given additional resources including cold weather homeless shelters as well as other recovery resources. Disposition:  DC     DISCHARGE PLAN:  1. There are no discharge medications for this patient. 2.   Follow-up Information    None       3. Return to ED if worse     Diagnosis     Clinical Impression:   1. Closed head injury, initial encounter    2. Fall, initial encounter        Attestations:    Luz Maria Lambert, DO    Please note that this dictation was completed with [employer]ary, the computer voice recognition software. Quite often unanticipated grammatical, syntax, homophones, and other interpretive errors are inadvertently transcribed by the computer software. Please disregard these errors. Please excuse any errors that have escaped final proofreading. Thank you.

## 2022-01-10 ENCOUNTER — HOSPITAL ENCOUNTER (EMERGENCY)
Age: 57
Discharge: HOME | End: 2022-01-10
Attending: EMERGENCY MEDICINE | Admitting: EMERGENCY MEDICINE
Payer: MEDICARE

## 2022-01-10 VITALS
OXYGEN SATURATION: 98 % | HEART RATE: 89 BPM | SYSTOLIC BLOOD PRESSURE: 149 MMHG | TEMPERATURE: 98.2 F | DIASTOLIC BLOOD PRESSURE: 90 MMHG

## 2022-01-10 DIAGNOSIS — Z76.0 MEDICATION REFILL: Primary | ICD-10-CM

## 2022-01-10 DIAGNOSIS — F10.10 ALCOHOL ABUSE: ICD-10-CM

## 2022-01-10 PROCEDURE — 99281 EMR DPT VST MAYX REQ PHY/QHP: CPT

## 2022-01-10 NOTE — ED PROVIDER NOTES
HPI   Patient is a 16-year-old male with long history of alcoholism, bipolar disorder, type 2 diabetes, hypertension and homelessness requesting refills on his medications and referrals for shelters. He only knows the name trazodone as far as his medications he knows that he was taking some type of blood pressure medication but is unfamiliar with the name. He admits that he has not taken any medications prescribed for him in months. He has not been vaccinated against COVID or influenza. Denies any fever, difficulty breathing, difficulty swallowing, SOB,chest pain or abdominal pain denies any nausea, vomiting or diarrhea. He states he has been smoking for 40 years. Past Medical History:   Diagnosis Date    Bipolar 1 disorder ([facility]     Diabetes ([facility]     Hypertension        No past surgical history on file. No family history on file. Social History     Socioeconomic History    Marital status:      Spouse name: Not on file    Number of children: Not on file    Years of education: Not on file    Highest education level: Not on file   Occupational History    Not on file   Tobacco Use    Smoking status: Former Smoker    Smokeless tobacco: Not on file   Substance and Sexual Activity    Alcohol use: Never    Drug use: Never    Sexual activity: Not on file   Other Topics Concern    Not on file   Social History Narrative    Not on file     Social Determinants of Health     Financial Resource Strain:     Difficulty of Paying Living Expenses: Not on file   Food Insecurity:     Worried About [address] in the Last Year: Not on file    [address] in the Last Year: Not on file   Transportation Needs:     Lack of Transportation (Medical): Not on file    Lack of Transportation (Non-Medical):  Not on file   Physical Activity:     Days of Exercise per Week: Not on file    Minutes of Exercise per Session: Not on file   Stress:     Feeling of Stress : Not on file   Social Connections:     Frequency of Communication with Friends and Family: Not on file    Frequency of Social Gatherings with Friends and Family: Not on file    Attends [employer] Services: Not on file    Active Member of Clubs or Organizations: Not on file    Attends Club or Organization Meetings: Not on file    Marital Status: Not on file   Intimate Partner Violence:     Fear of Current or Ex-Partner: Not on file    Emotionally Abused: Not on file    Physically Abused: Not on file    Sexually Abused: Not on file   Housing Stability:     Unable to Pay for Housing in the Last Year: Not on file    Number of [place] in the Last Year: Not on file    Unstable Housing in the Last Year: Not on file         ALLERGIES: Patient has no known allergies. Review of Systems   Constitutional: Negative for activity change, appetite change, fever and unexpected weight change. HENT: Negative for congestion, rhinorrhea, sore throat and trouble swallowing. Eyes: Negative for visual disturbance. Respiratory: Negative for cough and shortness of breath. Cardiovascular: Negative for chest pain, palpitations and leg swelling. Gastrointestinal: Negative for abdominal pain, diarrhea, nausea and vomiting. Genitourinary: Negative for dysuria and flank pain. Musculoskeletal: Negative for back pain and neck pain. Skin: Negative for rash. Neurological: Negative for headaches. All other systems reviewed and are negative. Vitals:    01/10/22 1201   BP: (!) 149/90   Pulse: 89   Temp: 98.2 °F (36.8 °C)   SpO2: 98%            Physical Exam  Vitals and nursing note reviewed. Constitutional:       General: He is not in acute distress. Appearance: Normal appearance. He is not ill-appearing, toxic-appearing or diaphoretic. Comments: Male, smoker; homeless   HENT:      Head: Normocephalic. Cardiovascular:      Rate and Rhythm: Normal rate and regular rhythm.    Pulmonary:      Effort: Pulmonary effort is normal.      Breath sounds: Normal breath sounds. Musculoskeletal:         General: Normal range of motion. Cervical back: Normal range of motion and neck supple. Right lower leg: No edema. Left lower leg: No edema. Lymphadenopathy:      Cervical: No cervical adenopathy. Skin:     General: Skin is warm and dry. Neurological:      Mental Status: He is alert. MDM       Procedures      Bsmart provided several resources for shelters and for crisis intervention. [facility]. No prescriptions were written for him. He is uncertain of the names of the medications except for trazodone that he has taken. Patient's results and plan of care have been reviewed with him. Patient has verbally conveyed his understanding and agreement of his signs, symptoms, diagnosis, treatment and prognosis and additionally agrees to follow up as recommended or return to the Emergency Room should his condition change prior to follow-up. Discharge instructions have also been provided to the patient with some educational information regarding his diagnosis as well a list of reasons why he would want to return to the ER prior to his follow-up appointment should his condition change. Teddy Leone NP

## 2022-01-10 NOTE — ED TRIAGE NOTES
Patient reports needs medications refills sent to [facility]. Trazadone and also needs blood glucose meter and a place to stay. [facility].

## 2022-01-12 ENCOUNTER — HOSPITAL ENCOUNTER (EMERGENCY)
Age: 57
Discharge: LEFT BEFORE DISCHARGE | End: 2022-01-12
Attending: EMERGENCY MEDICINE | Admitting: STUDENT IN AN ORGANIZED HEALTH CARE EDUCATION/TRAINING PROGRAM
Payer: MEDICARE

## 2022-01-12 VITALS
HEART RATE: 89 BPM | RESPIRATION RATE: 18 BRPM | SYSTOLIC BLOOD PRESSURE: 140 MMHG | DIASTOLIC BLOOD PRESSURE: 75 MMHG | TEMPERATURE: 98 F | OXYGEN SATURATION: 99 %

## 2022-01-12 DIAGNOSIS — Z91.14 POOR COMPLIANCE WITH MEDICATION: ICD-10-CM

## 2022-01-12 DIAGNOSIS — Z59.00 HOMELESS: ICD-10-CM

## 2022-01-12 DIAGNOSIS — F31.9 BIPOLAR AFFECTIVE DISORDER, REMISSION STATUS UNSPECIFIED (HCC): Primary | ICD-10-CM

## 2022-01-12 LAB
ALBUMIN SERPL-MCNC: 3.3 G/DL (ref 3.5–5)
ALBUMIN/GLOB SERPL: 0.8 {RATIO} (ref 1.1–2.2)
ALP SERPL-CCNC: 88 U/L (ref 45–117)
ALT SERPL-CCNC: 31 U/L (ref 12–78)
AMPHET UR QL SCN: NEGATIVE
ANION GAP SERPL CALC-SCNC: 5 MMOL/L (ref 5–15)
APPEARANCE UR: CLEAR
AST SERPL-CCNC: 17 U/L (ref 15–37)
BARBITURATES UR QL SCN: NEGATIVE
BASOPHILS # BLD: 0 K/UL (ref 0–0.1)
BASOPHILS NFR BLD: 0 % (ref 0–1)
BENZODIAZ UR QL: NEGATIVE
BILIRUB SERPL-MCNC: 0.2 MG/DL (ref 0.2–1)
BILIRUB UR QL: NEGATIVE
BUN SERPL-MCNC: 23 MG/DL (ref 6–20)
BUN/CREAT SERPL: 28 (ref 12–20)
CALCIUM SERPL-MCNC: 9.3 MG/DL (ref 8.5–10.1)
CANNABINOIDS UR QL SCN: NEGATIVE
CHLORIDE SERPL-SCNC: 102 MMOL/L (ref 97–108)
CO2 SERPL-SCNC: 29 MMOL/L (ref 21–32)
COCAINE UR QL SCN: NEGATIVE
COLOR UR: NORMAL
COMMENT, HOLDF: NORMAL
CREAT SERPL-MCNC: 0.82 MG/DL (ref 0.7–1.3)
DIFFERENTIAL METHOD BLD: NORMAL
DRUG SCRN COMMENT,DRGCM: NORMAL
EOSINOPHIL # BLD: 0.2 K/UL (ref 0–0.4)
EOSINOPHIL NFR BLD: 2 % (ref 0–7)
ERYTHROCYTE [DISTWIDTH] IN BLOOD BY AUTOMATED COUNT: 13.8 % (ref 11.5–14.5)
GLOBULIN SER CALC-MCNC: 4 G/DL (ref 2–4)
GLUCOSE SERPL-MCNC: 143 MG/DL (ref 65–100)
GLUCOSE UR STRIP.AUTO-MCNC: NEGATIVE MG/DL
HCT VFR BLD AUTO: 42.1 % (ref 36.6–50.3)
HGB BLD-MCNC: 13.7 G/DL (ref 12.1–17)
HGB UR QL STRIP: NEGATIVE
IMM GRANULOCYTES # BLD AUTO: 0 K/UL (ref 0–0.04)
IMM GRANULOCYTES NFR BLD AUTO: 0 % (ref 0–0.5)
KETONES UR QL STRIP.AUTO: NEGATIVE MG/DL
LEUKOCYTE ESTERASE UR QL STRIP.AUTO: NEGATIVE
LYMPHOCYTES # BLD: 2.4 K/UL (ref 0.8–3.5)
LYMPHOCYTES NFR BLD: 26 % (ref 12–49)
MCH RBC QN AUTO: 30.4 PG (ref 26–34)
MCHC RBC AUTO-ENTMCNC: 32.5 G/DL (ref 30–36.5)
MCV RBC AUTO: 93.3 FL (ref 80–99)
METHADONE UR QL: NEGATIVE
MONOCYTES # BLD: 0.6 K/UL (ref 0–1)
MONOCYTES NFR BLD: 7 % (ref 5–13)
NEUTS SEG # BLD: 5.8 K/UL (ref 1.8–8)
NEUTS SEG NFR BLD: 65 % (ref 32–75)
NITRITE UR QL STRIP.AUTO: NEGATIVE
NRBC # BLD: 0 K/UL (ref 0–0.01)
NRBC BLD-RTO: 0 PER 100 WBC
OPIATES UR QL: NEGATIVE
PCP UR QL: NEGATIVE
PH UR STRIP: 6.5 [PH] (ref 5–8)
PLATELET # BLD AUTO: 191 K/UL (ref 150–400)
PMV BLD AUTO: 10.7 FL (ref 8.9–12.9)
POTASSIUM SERPL-SCNC: 3.9 MMOL/L (ref 3.5–5.1)
PROT SERPL-MCNC: 7.3 G/DL (ref 6.4–8.2)
PROT UR STRIP-MCNC: NEGATIVE MG/DL
RBC # BLD AUTO: 4.51 M/UL (ref 4.1–5.7)
SAMPLES BEING HELD,HOLD: NORMAL
SODIUM SERPL-SCNC: 136 MMOL/L (ref 136–145)
SP GR UR REFRACTOMETRY: 1.02 (ref 1–1.03)
UROBILINOGEN UR QL STRIP.AUTO: 0.2 EU/DL (ref 0.2–1)
WBC # BLD AUTO: 9 K/UL (ref 4.1–11.1)

## 2022-01-12 PROCEDURE — 99284 EMERGENCY DEPT VISIT MOD MDM: CPT

## 2022-01-12 PROCEDURE — 85025 COMPLETE CBC W/AUTO DIFF WBC: CPT

## 2022-01-12 PROCEDURE — 36415 COLL VENOUS BLD VENIPUNCTURE: CPT

## 2022-01-12 PROCEDURE — 80307 DRUG TEST PRSMV CHEM ANLYZR: CPT

## 2022-01-12 PROCEDURE — 99285 EMERGENCY DEPT VISIT HI MDM: CPT

## 2022-01-12 PROCEDURE — 80053 COMPREHEN METABOLIC PANEL: CPT

## 2022-01-12 PROCEDURE — 81003 URINALYSIS AUTO W/O SCOPE: CPT

## 2022-01-12 SDOH — ECONOMIC STABILITY - HOUSING INSECURITY: HOMELESSNESS UNSPECIFIED: Z59.00

## 2022-01-12 NOTE — BSMART NOTE
This writer attempted to meet with patient again for assessment, patient left the ER according to case management (Sultana) to smoke a cigarette. This writer provided cold weather shelter information for patient and put on his chair in [place]. According to case management, patient denies SI, HI, AH,VH, and paranoia. Patient also is declining referrals and resources, patient is requesting to go to appliance store. This writer did observe patient talking on the phone with a family member as well. Attending ER physician, Juan Ramon Coello is in agreement that patient does not meet criteria for behavioral health admission and is supportive of discharge with referral to the cold weather shelter. [place] Suicide Risk Scale    This writer reviewed the [place] Suicide Severity Rating Scale in nursing flowsheet and the risk level assigned was not completed by nursing.   Based on this assessment, the risk of suicide is low and the plan is discharge with referral.

## 2022-01-12 NOTE — ED NOTES
3:00 PM  Change of shift. Care of patient taken over from  ; H&P reviewed, bedside handoff complete. Awaiting BSMART     6:36 PM  Per BSMART, patient does not meet criteria. I spoke with case management. Unable to verify his medications at this time or dosing. Plan for patient to be discharged home with follow up with the physician that filled these medications.     Jillian Nieves MD

## 2022-01-12 NOTE — BSMART NOTE
Writer attempted to meet with patient, patient is still sound asleep in [place]. This writer will attempt to obtain information at a later time.

## 2022-01-12 NOTE — BSMART NOTE
This writer attempted to talk with patient who was asleep. He would wake up briefly and keep falling back asleep. Per record review, patient has history of Bipolar and Alcohol Use Disorder. He was reported seen here previously and was reported to be on Trazedone and a blood pressure medication. He was referred to [facility] but did not end up going there. The only information that could be gleaned from patient at this time was that he wants to go to [facility] and has no transportation. BSMART will attempt to speak with patient to further assess needs once he is awake and able to participate.

## 2022-01-12 NOTE — ED TRIAGE NOTES
Patient arrived via EMS with reported feeling like sugar was low so he ate a stack of cheese and called 911.   He says he has to go to [place] to get his medications refilled and has no way to get there. He has been sleeping outside.

## 2022-01-12 NOTE — ED PROVIDER NOTES
This is a 51-year-old male with a history of bipolar disorder, diabetes and hypertension. He apparently has spent the last 2 nights on the [address]. He is traveling back to [place]. He ran out of his bipolar medication which is trazodone and his metformin approximately 2 weeks ago. He called EMS because he felt his sugar was low and was eating cheese on the ambulance stretcher on his way to the hospital.  Denies any history of nausea or vomiting, no chest pain and no shortness of breath. He states all he wants is his medication and to get back to [place]. He has no other complaints presently that he will relate. Past Medical History:   Diagnosis Date    B[facility] 1 disorder ([facility]     Diabetes ([facility]     Hypertension        No past surgical history on file. No family history on file. Social History     Socioeconomic History    Marital status:      Spouse name: Not on file    Number of children: Not on file    Years of education: Not on file    Highest education level: Not on file   Occupational History    Not on file   Tobacco Use    Smoking status: Former Smoker    Smokeless tobacco: Not on file   Substance and Sexual Activity    Alcohol use: Never    Drug use: Never    Sexual activity: Not on file   Other Topics Concern    Not on file   Social History Narrative    Not on file     Social Determinants of Health     Financial Resource Strain:     Difficulty of Paying Living Expenses: Not on file   Food Insecurity:     Worried About [address] in the Last Year: Not on file    [address] in the Last Year: Not on file   Transportation Needs:     Lack of Transportation (Medical): Not on file    Lack of Transportation (Non-Medical):  Not on file   Physical Activity:     Days of Exercise per Week: Not on file    Minutes of Exercise per Session: Not on file   Stress:     Feeling of Stress : Not on file   Social Connections:     Frequency of Communication with Friends and Family: Not on file    Frequency of Social Gatherings with Friends and Family: Not on file    Attends Evangelical Services: Not on file    Active Member of Clubs or Organizations: Not on file    Attends Club or Organization Meetings: Not on file    Marital Status: Not on file   Intimate Partner Violence:     Fear of Current or Ex-Partner: Not on file    Emotionally Abused: Not on file    Physically Abused: Not on file    Sexually Abused: Not on file   Housing Stability:     Unable to Pay for Housing in the Last Year: Not on file    Number of [place] in the Last Year: Not on file    Unstable Housing in the Last Year: Not on file         ALLERGIES: Patient has no known allergies. Review of Systems   Constitutional: Negative for activity change, appetite change, chills, fatigue and fever. HENT: Negative for ear pain, facial swelling, sore throat and trouble swallowing. Eyes: Negative for pain, discharge and visual disturbance. Respiratory: Negative for chest tightness, shortness of breath and wheezing. Cardiovascular: Negative for chest pain and palpitations. Gastrointestinal: Negative for abdominal pain, blood in stool, nausea and vomiting. Genitourinary: Negative for difficulty urinating, flank pain and hematuria. Musculoskeletal: Negative for arthralgias, joint swelling, myalgias and neck pain. Skin: Negative for color change and rash. Neurological: Negative for dizziness, weakness, numbness and headaches. Hematological: Negative for adenopathy. Does not bruise/bleed easily. Psychiatric/Behavioral: Negative for behavioral problems, confusion and sleep disturbance. All other systems reviewed and are negative. Vitals:    01/12/22 1223   BP: (!) 140/75   Pulse: 89   Resp: 18   Temp: 98 °F (36.7 °C)   SpO2: 99%            Physical Exam  Vitals and nursing note reviewed. Constitutional:       General: He is not in acute distress. Appearance: He is well-developed. Comments: This is a 30-year-old male who presents with what he says is high blood sugar. Vital signs are as noted and his sugar was noted to be 288. HENT:      Head: Normocephalic and atraumatic. Nose: Nose normal.   Eyes:      General: No scleral icterus. Conjunctiva/sclera: Conjunctivae normal.      Pupils: Pupils are equal, round, and reactive to light. Neck:      Thyroid: No thyromegaly. Vascular: No JVD. Trachea: No tracheal deviation. Comments: No carotid bruits noted. Cardiovascular:      Rate and Rhythm: Normal rate and regular rhythm. Heart sounds: Normal heart sounds. No murmur heard. No friction rub. No gallop. Pulmonary:      Effort: Pulmonary effort is normal. No respiratory distress. Breath sounds: Normal breath sounds. No wheezing or rales. Chest:      Chest wall: No tenderness. Abdominal:      General: Bowel sounds are normal. There is no distension. Palpations: Abdomen is soft. There is no mass. Tenderness: There is no abdominal tenderness. There is no guarding or rebound. Musculoskeletal:         General: No tenderness. Normal range of motion. Cervical back: Normal range of motion and neck supple. Lymphadenopathy:      Cervical: No cervical adenopathy. Skin:     General: Skin is warm and dry. Capillary Refill: Capillary refill takes 2 to 3 seconds. Findings: No erythema or rash. Neurological:      General: No focal deficit present. Mental Status: He is alert and oriented to person, place, and time. Cranial Nerves: No cranial nerve deficit. Coordination: Coordination normal.      Deep Tendon Reflexes: Reflexes are normal and symmetric. Comments: Patient did not keep falling asleep during my interview. Sequently he has been wide-awake and eating his lunch and communicative. Psychiatric:      Comments: Patient is alert and oriented x3. He is not agitated.   He appears to be somewhat sleepy. He is not aggressive or demanding. MDM  Number of Diagnoses or Management Options     Amount and/or Complexity of Data Reviewed  Clinical lab tests: ordered and reviewed  Decide to obtain previous medical records or to obtain history from someone other than the patient: yes  Review and summarize past medical records: yes  Discuss the patient with other providers: yes    Risk of Complications, Morbidity, and/or Mortality  Presenting problems: high  Diagnostic procedures: high  Management options: moderate    Patient Progress  Patient progress: stable         Procedures    This is a 53-year-old male who has run out of his trazodone and metformin. He is not clear what other medicines he takes. He has been allegedly for about 2 weeks. He is currently trying to get back to [facility] and came here because he wanted his medications and help getting back. He is not expressing any suicidal or homicidal ideation. Labs are being obtained, consult with case management and with [facility] have been ordered. I have spoken with the psychiatric counselor about this patient. He is awaiting case management to see and evaluate. The question is whether or not there is some way we can assist in getting him some of his medication. The other question is the alternatives and getting him back to [facility]. These are questions that will be addressed by case management. The patient was turned over to oncoming ER physician at change of shift. Definitive plan was still pending by case management and psychiatry.

## 2022-01-12 NOTE — PROGRESS NOTES
1630 Attempted X 2 to discuss options, he is NOT alert/oriented. Unable to assess at this time. Date of previous inpatient admission/ ED visit?    12/2/21 Homelessness [id number]  12/7/21 Closed head injury [id number]  1/10/22 Medication Refill [facility]    What brought the patient back to ED? \"feels like my sugar is low\"    Did patient decline recommended services during last admission/ ED visit (if yes, what)? Yes    Has patient seen a provider since their last inpatient admission/ED visit (if yes, when)? No    PCP: First and Last name: No PCP   Name of Practice:    Are you a current patient: Yes/No:    Approximate date of last visit:      ED workup:   ED CBC, CMP, Urinalysis    CM/ LOY received call from Charge RN. Noted he was seen on 1/10 and [facility] provided him with resources. Chart indicates he had completed intake process with the [facility] in December, he later decided that he did not want to pursue. Chart indicates he was [facility] , he was discharged from their program and CANNOT return. CM/LOY will give him resources to see if he would like to make choices for sobriety.       Shannon Zaidi, RN, BSN, [facility]  ED Care Management  [phone]

## 2022-01-13 NOTE — ED NOTES
Patient still has not returned to department. Unable to locate patient outside either. Will assume patient left prior to getting discharge instructions and resources.

## 2022-01-15 ENCOUNTER — HOSPITAL ENCOUNTER (EMERGENCY)
Age: 57
Discharge: HOME | End: 2022-01-15
Attending: STUDENT IN AN ORGANIZED HEALTH CARE EDUCATION/TRAINING PROGRAM | Admitting: STUDENT IN AN ORGANIZED HEALTH CARE EDUCATION/TRAINING PROGRAM
Payer: MEDICARE

## 2022-01-15 VITALS
DIASTOLIC BLOOD PRESSURE: 75 MMHG | SYSTOLIC BLOOD PRESSURE: 157 MMHG | BODY MASS INDEX: 28.44 KG/M2 | HEART RATE: 96 BPM | TEMPERATURE: 98.8 F | HEIGHT: 72 IN | RESPIRATION RATE: 19 BRPM | OXYGEN SATURATION: 97 % | WEIGHT: 210 LBS

## 2022-01-15 DIAGNOSIS — F10.920 ALCOHOLIC INTOXICATION WITHOUT COMPLICATION (HCC): Primary | ICD-10-CM

## 2022-01-15 DIAGNOSIS — Z59.00 HOMELESSNESS: ICD-10-CM

## 2022-01-15 DIAGNOSIS — Z76.0 MEDICATION REFILL: ICD-10-CM

## 2022-01-15 DIAGNOSIS — R73.9 HYPERGLYCEMIA: ICD-10-CM

## 2022-01-15 LAB
ALBUMIN SERPL-MCNC: 3.1 G/DL (ref 3.5–5)
ALBUMIN/GLOB SERPL: 0.8 {RATIO} (ref 1.1–2.2)
ALP SERPL-CCNC: 84 U/L (ref 45–117)
ALT SERPL-CCNC: 32 U/L (ref 12–78)
ANION GAP SERPL CALC-SCNC: 6 MMOL/L (ref 5–15)
AST SERPL-CCNC: 25 U/L (ref 15–37)
BASOPHILS # BLD: 0 K/UL (ref 0–0.1)
BASOPHILS NFR BLD: 0 % (ref 0–1)
BILIRUB SERPL-MCNC: 0.2 MG/DL (ref 0.2–1)
BUN SERPL-MCNC: 13 MG/DL (ref 6–20)
BUN/CREAT SERPL: 14 (ref 12–20)
CALCIUM SERPL-MCNC: 8.8 MG/DL (ref 8.5–10.1)
CHLORIDE SERPL-SCNC: 105 MMOL/L (ref 97–108)
CO2 SERPL-SCNC: 29 MMOL/L (ref 21–32)
CREAT SERPL-MCNC: 0.9 MG/DL (ref 0.7–1.3)
DIFFERENTIAL METHOD BLD: NORMAL
EOSINOPHIL # BLD: 0.2 K/UL (ref 0–0.4)
EOSINOPHIL NFR BLD: 3 % (ref 0–7)
ERYTHROCYTE [DISTWIDTH] IN BLOOD BY AUTOMATED COUNT: 13.9 % (ref 11.5–14.5)
GLOBULIN SER CALC-MCNC: 4.1 G/DL (ref 2–4)
GLUCOSE BLD STRIP.AUTO-MCNC: 269 MG/DL (ref 65–117)
GLUCOSE SERPL-MCNC: 308 MG/DL (ref 65–100)
HCT VFR BLD AUTO: 39.7 % (ref 36.6–50.3)
HGB BLD-MCNC: 13 G/DL (ref 12.1–17)
IMM GRANULOCYTES # BLD AUTO: 0 K/UL (ref 0–0.04)
IMM GRANULOCYTES NFR BLD AUTO: 0 % (ref 0–0.5)
LYMPHOCYTES # BLD: 1.1 K/UL (ref 0.8–3.5)
LYMPHOCYTES NFR BLD: 15 % (ref 12–49)
MCH RBC QN AUTO: 30.4 PG (ref 26–34)
MCHC RBC AUTO-ENTMCNC: 32.7 G/DL (ref 30–36.5)
MCV RBC AUTO: 92.8 FL (ref 80–99)
MONOCYTES # BLD: 0.6 K/UL (ref 0–1)
MONOCYTES NFR BLD: 8 % (ref 5–13)
NEUTS SEG # BLD: 5.5 K/UL (ref 1.8–8)
NEUTS SEG NFR BLD: 74 % (ref 32–75)
NRBC # BLD: 0 K/UL (ref 0–0.01)
NRBC BLD-RTO: 0 PER 100 WBC
PLATELET # BLD AUTO: 160 K/UL (ref 150–400)
PMV BLD AUTO: 10.7 FL (ref 8.9–12.9)
POTASSIUM SERPL-SCNC: 3.9 MMOL/L (ref 3.5–5.1)
PROT SERPL-MCNC: 7.2 G/DL (ref 6.4–8.2)
RBC # BLD AUTO: 4.28 M/UL (ref 4.1–5.7)
SERVICE CMNT-IMP: ABNORMAL
SODIUM SERPL-SCNC: 140 MMOL/L (ref 136–145)
WBC # BLD AUTO: 7.5 K/UL (ref 4.1–11.1)

## 2022-01-15 PROCEDURE — 36415 COLL VENOUS BLD VENIPUNCTURE: CPT

## 2022-01-15 PROCEDURE — 80053 COMPREHEN METABOLIC PANEL: CPT

## 2022-01-15 PROCEDURE — 82962 GLUCOSE BLOOD TEST: CPT

## 2022-01-15 PROCEDURE — 99284 EMERGENCY DEPT VISIT MOD MDM: CPT

## 2022-01-15 PROCEDURE — 74011250637 HC RX REV CODE- 250/637: Performed by: STUDENT IN AN ORGANIZED HEALTH CARE EDUCATION/TRAINING PROGRAM

## 2022-01-15 PROCEDURE — 85025 COMPLETE CBC W/AUTO DIFF WBC: CPT

## 2022-01-15 RX ORDER — DIVALPROEX SODIUM 500 MG/1
TABLET, DELAYED RELEASE ORAL
COMMUNITY
Start: 2021-11-16

## 2022-01-15 RX ORDER — METFORMIN HYDROCHLORIDE 1000 MG/1
TABLET ORAL
COMMUNITY
Start: 2021-12-15 | End: 2022-01-15

## 2022-01-15 RX ORDER — METFORMIN HYDROCHLORIDE 500 MG/1
1000 TABLET ORAL
Status: COMPLETED | OUTPATIENT
Start: 2022-01-15 | End: 2022-01-15

## 2022-01-15 RX ORDER — CHOLECALCIFEROL (VITAMIN D3) 125 MCG
CAPSULE ORAL
COMMUNITY
Start: 2021-11-16

## 2022-01-15 RX ORDER — CITALOPRAM 10 MG/1
TABLET ORAL
COMMUNITY
Start: 2021-12-15

## 2022-01-15 RX ORDER — HYDROXYZINE 50 MG/1
TABLET, FILM COATED ORAL
COMMUNITY
Start: 2021-12-15

## 2022-01-15 RX ORDER — TRAZODONE HYDROCHLORIDE 100 MG/1
100 TABLET ORAL AT BEDTIME
COMMUNITY
Start: 2021-12-30

## 2022-01-15 RX ORDER — METFORMIN HYDROCHLORIDE 1000 MG/1
1000 TABLET ORAL DAILY
Qty: 30 TABLET | Refills: 0 | Status: SHIPPED | OUTPATIENT
Start: 2022-01-15 | End: 2022-02-14

## 2022-01-15 RX ORDER — LISINOPRIL 10 MG/1
10 TABLET ORAL DAILY
COMMUNITY
Start: 2021-12-30

## 2022-01-15 RX ORDER — LITHIUM CARBONATE 450 MG/1
TABLET ORAL
COMMUNITY
Start: 2021-12-15

## 2022-01-15 RX ORDER — OXYBUTYNIN CHLORIDE 5 MG/1
TABLET, EXTENDED RELEASE ORAL
COMMUNITY
Start: 2021-12-15

## 2022-01-15 RX ORDER — AMLODIPINE BESYLATE 10 MG/1
TABLET ORAL
COMMUNITY
Start: 2021-12-15

## 2022-01-15 RX ORDER — PRAVASTATIN SODIUM 80 MG/1
TABLET ORAL
COMMUNITY
Start: 2021-12-15

## 2022-01-15 RX ADMIN — METFORMIN HYDROCHLORIDE 1000 MG: 500 TABLET ORAL at 04:15

## 2022-01-15 SDOH — ECONOMIC STABILITY - HOUSING INSECURITY: HOMELESSNESS UNSPECIFIED: Z59.00

## 2022-01-15 NOTE — ED NOTES
Patient ambulatory to ED w EMS, per EMS patient called for medical attention as he is homeless and he reports he was cold and his glucose was out of wack. Patient A&O x4 and placed in a position of comfort w no acute complaints.

## 2022-01-15 NOTE — ED PROVIDER NOTES
EMERGENCY DEPARTMENT HISTORY AND PHYSICAL EXAM      Date: 1/15/2022  Patient Name: Jackson Brown    History of Presenting Illness     No chief complaint on file. History Provided By: Patient    HPI: Jackson Brown, 49 y.o. male with past medical history of hypertension, hyperlipidemia, bipolar affective disorder, diabetes, presents to the ED with cc of alcohol intoxication, homelessness, and \"hypothermia. \"  Patient reports that he called EMS for \"hypothermia\" which he admits is self-induced due to recent alcohol use, and being homeless. He states that he continues to feel intoxicated at this time, though he admits he is doing \"better\" now that he is in the ED. patient reportedly told nurse that he was concerned that his glucose was out of whack, however, denies this concern to me. He states that he has no other complaints at this time. PCP: None    No current facility-administered medications on file prior to encounter. Current Outpatient Medications on File Prior to Encounter   Medication Sig Dispense Refill    lisinopriL (PRINIVIL, ZESTRIL) 10 mg tablet Take 10 mg by mouth daily.  traZODone (DESYREL) 100 mg tablet Take 100 mg by mouth At bedtime.  amLODIPine (NORVASC) 10 mg tablet       citalopram (CELEXA) 10 mg tablet       cholecalciferol, vitamin D3, 50 mcg (2,000 unit) tab       hydrOXYzine HCL (ATARAX) 50 mg tablet       divalproex DR (DEPAKOTE) 500 mg tablet       lithium carbonate CR (ESKALITH CR) 450 mg CR tablet       metFORMIN (GLUCOPHAGE) 1,000 mg tablet       oxybutynin chloride XL (DITROPAN XL) 5 mg CR tablet       pravastatin (PRAVACHOL) 80 mg tablet          Past History     Past Medical History:  Past Medical History:   Diagnosis Date    B[facility] 1 disorder ([facility]     Diabetes ([facility] [facility]     Hypertension        Past Surgical History:  No past surgical history on file. Family History:  No family history on file.     Social History:  Social History     Tobacco Use    Smoking status: Former Smoker    Smokeless tobacco: Not on file   Substance Use Topics    Alcohol use: Never    Drug use: Never       Allergies:  No Known Allergies      Review of Systems   Review of Systems   Unable to perform ROS: Other   Patient intoxicated    Physical Exam   Physical Exam  Vitals and nursing note reviewed. Constitutional:       General: He is sleeping. He is not in acute distress. Appearance: Normal appearance. He is not ill-appearing or toxic-appearing. Comments: Patient appears intoxicated and somewhat disheveled. HENT:      Head: Normocephalic and atraumatic. Nose: Nose normal.      Mouth/Throat:      Mouth: Mucous membranes are moist.   Eyes:      Extraocular Movements: Extraocular movements intact. Pupils: Pupils are equal, round, and reactive to light. Cardiovascular:      Rate and Rhythm: Normal rate and regular rhythm. Pulses: Normal pulses. Pulmonary:      Effort: Pulmonary effort is normal. No respiratory distress. Breath sounds: Normal breath sounds. No stridor. No wheezing or rhonchi. Abdominal:      General: Abdomen is flat. There is no distension. Palpations: There is no mass. Tenderness: There is no abdominal tenderness. Musculoskeletal:         General: Normal range of motion. Cervical back: Normal range of motion and neck supple. Skin:     General: Skin is warm and dry. Neurological:      General: No focal deficit present. Mental Status: Mental status is at baseline. Sensory: No sensory deficit. Motor: No weakness.          Diagnostic Study Results     Labs -     Recent Results (from the past 24 hour(s))   GLUCOSE, POC    Collection Time: 01/15/22  2:17 AM   Result Value Ref Range    Glucose (POC) 269 (H) 65 - 117 mg/dL    Performed by John RANDLE)    CBC WITH AUTOMATED DIFF    Collection Time: 01/15/22  2:18 AM   Result Value Ref Range    WBC 7.5 4.1 - 11.1 K/uL    RBC 4.28 4.10 - 5.70 M/uL HGB 13.0 12.1 - 17.0 g/dL    HCT 39.7 36.6 - 50.3 %    MCV 92.8 80.0 - 99.0 FL    MCH 30.4 26.0 - 34.0 PG    MCHC 32.7 30.0 - 36.5 g/dL    RDW 13.9 11.5 - 14.5 %    PLATELET 653 693 - 003 K/uL    MPV 10.7 8.9 - 12.9 FL    NRBC 0.0 0  WBC    ABSOLUTE NRBC 0.00 0.00 - 0.01 K/uL    NEUTROPHILS 74 32 - 75 %    LYMPHOCYTES 15 12 - 49 %    MONOCYTES 8 5 - 13 %    EOSINOPHILS 3 0 - 7 %    BASOPHILS 0 0 - 1 %    IMMATURE GRANULOCYTES 0 0.0 - 0.5 %    ABS. NEUTROPHILS 5.5 1.8 - 8.0 K/UL    ABS. LYMPHOCYTES 1.1 0.8 - 3.5 K/UL    ABS. MONOCYTES 0.6 0.0 - 1.0 K/UL    ABS. EOSINOPHILS 0.2 0.0 - 0.4 K/UL    ABS. BASOPHILS 0.0 0.0 - 0.1 K/UL    ABS. IMM. GRANS. 0.0 0.00 - 0.04 K/UL    DF AUTOMATED     METABOLIC PANEL, COMPREHENSIVE    Collection Time: 01/15/22  2:18 AM   Result Value Ref Range    Sodium 140 136 - 145 mmol/L    Potassium 3.9 3.5 - 5.1 mmol/L    Chloride 105 97 - 108 mmol/L    CO2 29 21 - 32 mmol/L    Anion gap 6 5 - 15 mmol/L    Glucose 308 (H) 65 - 100 mg/dL    BUN 13 6 - 20 MG/DL    Creatinine 0.90 0.70 - 1.30 MG/DL    BUN/Creatinine ratio 14 12 - 20      GFR est AA >60 >60 ml/min/1.73m2    GFR est non-AA >60 >60 ml/min/1.73m2    Calcium 8.8 8.5 - 10.1 MG/DL    Bilirubin, total 0.2 0.2 - 1.0 MG/DL    ALT (SGPT) 32 12 - 78 U/L    AST (SGOT) 25 15 - 37 U/L    Alk. phosphatase 84 45 - 117 U/L    Protein, total 7.2 6.4 - 8.2 g/dL    Albumin 3.1 (L) 3.5 - 5.0 g/dL    Globulin 4.1 (H) 2.0 - 4.0 g/dL    A-G Ratio 0.8 (L) 1.1 - 2.2         Radiologic Studies -   No orders to display     CT Results  (Last 48 hours)    None        CXR Results  (Last 48 hours)    None          Medical Decision Making   Wilfred CASTRO MD am the first provider for this patient, and I am the attending of record for this patient encounter. I reviewed the vital signs, available nursing notes, past medical history, past surgical history, family history and social history. Vital Signs-Reviewed the patient's vital signs.   Patient Vitals for the past 24 hrs:   Temp Pulse Resp BP SpO2   01/15/22 0332    (!) 157/75    01/15/22 0212 98.8 °F (37.1 °C) 88 16 (!) 182/88 98 %       Records Reviewed: Nursing Notes and Old Medical Records    Provider Notes (Medical Decision Making):   Patient appears intoxicated, sleepy, otherwise not in any acute distress. He is alert and oriented x4 once awoken. No signs of head or neck trauma. No focal neurologic deficits. Point-of-care glucose 269. Basic labs were sent prior to my evaluation, reviewed and noted to be remarkable only for elevated glucose of 308, no anion gap. Will give 1 dose of home metformin 1000 mg. No need to emergently treat with insulin in the ED as patient not in DKA or HHS. He has been eating and drinking without difficulty in the ED. Plan to discharge once clinically sober. Patient was observed in the ED for approximately 4 hours. He reports feeling better at the time of discharge. States that he has a place/shelter to go to. His elevated glucose level was discussed with him. Patient informed me at this time that he does not have any more metformin, he has not been taking his medication. Considering this, will send Rx for metformin to designated pharmacy-which patient reports is close to his shelter. Advised PCP follow-up, and medication compliance. Patient is able to ambulate in the ED with a strong and steady gait, denies any complaints or concerns regarding plan for discharge. He is clinically sober at this time, and independently ambulatory out of the ED. ED Course:   Initial assessment performed. The patient's presenting problems have been discussed, and they are in agreement with the care plan formulated and outlined with them. I have encouraged them to ask questions as they arise throughout their visit. Poonam Tariq MD      Disposition:  Discharge      DISCHARGE PLAN:  1.    Discharge Medication List as of 1/15/2022  5:31 AM      CONTINUE these medications which have NOT CHANGED    Details   lisinopriL (PRINIVIL, ZESTRIL) 10 mg tablet Take 10 mg by mouth daily. , Historical Med      traZODone (DESYREL) 100 mg tablet Take 100 mg by mouth At bedtime. , Historical Med      amLODIPine (NORVASC) 10 mg tablet Historical Med      citalopram (CELEXA) 10 mg tablet Historical Med      cholecalciferol, vitamin D3, 50 mcg (2,000 unit) tab Historical Med      hydrOXYzine HCL (ATARAX) 50 mg tablet Historical Med      divalproex DR (DEPAKOTE) 500 mg tablet Historical Med      lithium carbonate CR (ESKALITH CR) 450 mg CR tablet Historical Med      oxybutynin chloride XL (DITROPAN XL) 5 mg CR tablet Historical Med      pravastatin (PRAVACHOL) 80 mg tablet Historical Med      metFORMIN (GLUCOPHAGE) 1,000 mg tablet Historical Med           2. Follow-up Information     [facility]  [address] [facility]  [phone]    [facility]   As needed to establish primary care [address] [place] [address]  [phone]        3. Return to ED if worse     Diagnosis     Clinical Impression:   1. Alcoholic intoxication without complication ([facility] [address]    2. Homelessness    3. Hyperglycemia    4. Medication refill        Attestations:    Julio John MD    Please note that this dictation was completed with [email or web address], the computer voice recognition software. Quite often unanticipated grammatical, syntax, homophones, and other interpretive errors are inadvertently transcribed by the computer software. Please disregard these errors. Please excuse any errors that have escaped final proofreading. Thank you.

## 2022-01-15 NOTE — ED NOTES
1377 - Patient discharge by Jessica Dow MD - pt sent to the front lobby, with strong and steady gait -  Discharge information / home RX / and reasons to return to the ED were reviewed by the doctor.       Pt will wait in the front lobby for a ride home;;

## 2022-01-18 ENCOUNTER — HOSPITAL ENCOUNTER (EMERGENCY)
Age: 57
Discharge: HOME | End: 2022-01-18
Attending: EMERGENCY MEDICINE
Payer: MEDICARE

## 2022-01-18 VITALS
WEIGHT: 215 LBS | HEART RATE: 83 BPM | SYSTOLIC BLOOD PRESSURE: 184 MMHG | OXYGEN SATURATION: 99 % | DIASTOLIC BLOOD PRESSURE: 99 MMHG | HEIGHT: 72 IN | RESPIRATION RATE: 16 BRPM | TEMPERATURE: 98.7 F | BODY MASS INDEX: 29.12 KG/M2

## 2022-01-18 DIAGNOSIS — Z59.00 HOMELESSNESS: ICD-10-CM

## 2022-01-18 DIAGNOSIS — Z76.5 MALINGERING: Primary | ICD-10-CM

## 2022-01-18 PROCEDURE — 99284 EMERGENCY DEPT VISIT MOD MDM: CPT

## 2022-01-18 SDOH — ECONOMIC STABILITY - HOUSING INSECURITY: HOMELESSNESS UNSPECIFIED: Z59.00

## 2022-01-19 NOTE — ED TRIAGE NOTES
Pt arrives with [facility] PD who dropped him off and left. Pt states he is here for mental health problems. Denies SI/HI. Reports that he reached the end of his rope and reached out to [facility] for help. Pt states medications are at the pharmacy but he doesn't have money to pick them up.

## 2022-01-19 NOTE — ED PROVIDER NOTES
EMERGENCY DEPARTMENT HISTORY AND PHYSICAL EXAM      Date: 1/18/2022  Patient Name: Mauricio Sutton    Please note that this dictation was completed with [employer], the computer voice recognition software. Quite often unanticipated grammatical, syntax, homophones, and other interpretive errors are inadvertently transcribed by the computer software. Please disregard these errors. Please excuse any errors that have escaped final proofreading. History of Presenting Illness     Chief Complaint   Patient presents with   [address]       History Provided By: Patient     HPI: Mauricio Sutton, 49 y.o. male, presenting the emergency department complaining of mental health complaint. Patient states he is \"at the end of his rope. He states he has nowhere to go and he is homeless. This is making him feel sad. He has no specific thoughts of suicide, has made no acts of furtherance. Denies suicidal or homicidal ideation. He is requesting help with housing. He drinks alcohol daily, states he will use drugs if drugs are available    PCP: None    No current facility-administered medications on file prior to encounter. Current Outpatient Medications on File Prior to Encounter   Medication Sig Dispense Refill    amLODIPine (NORVASC) 10 mg tablet       citalopram (CELEXA) 10 mg tablet       cholecalciferol, vitamin D3, 50 mcg (2,000 unit) tab       hydrOXYzine HCL (ATARAX) 50 mg tablet       divalproex DR (DEPAKOTE) 500 mg tablet       lisinopriL (PRINIVIL, ZESTRIL) 10 mg tablet Take 10 mg by mouth daily.  lithium carbonate CR (ESKALITH CR) 450 mg CR tablet       oxybutynin chloride XL (DITROPAN XL) 5 mg CR tablet       traZODone (DESYREL) 100 mg tablet Take 100 mg by mouth At bedtime.  pravastatin (PRAVACHOL) 80 mg tablet       metFORMIN (GLUCOPHAGE) 1,000 mg tablet Take 1 Tablet by mouth daily for 30 days.  30 Tablet 0       Past History     Past Medical History:  Past Medical History: Diagnosis Date    [facility] 1 disorder ([facility]     Diabetes ([facility]     Hypertension        Past Surgical History:  History reviewed. No pertinent surgical history. Family History:  History reviewed. No pertinent family history. Social History:  Social History     Tobacco Use    Smoking status: Former Smoker     Packs/day: 2.00    Smokeless tobacco: Never Used    Tobacco comment: \"i smoke like a freight train\"   Substance Use Topics    Alcohol use: Yes     Comment: \"every chance i get\"    Drug use: Yes     Types: Marijuana, Cocaine       Allergies:  No Known Allergies      Review of Systems   Review of Systems   Constitutional: Negative for chills and fever. HENT: Negative for congestion and sore throat. Eyes: Negative for visual disturbance. Respiratory: Negative for cough and shortness of breath. Cardiovascular: Negative for chest pain and leg swelling. Gastrointestinal: Negative for abdominal pain, blood in stool, diarrhea and nausea. Endocrine: Negative for polyuria. Genitourinary: Negative for dysuria and testicular pain. Musculoskeletal: Negative for arthralgias, joint swelling and myalgias. Skin: Negative for rash. Allergic/Immunologic: Negative for immunocompromised state. Neurological: Negative for weakness and headaches. Hematological: Does not bruise/bleed easily. Psychiatric/Behavioral: Negative for confusion. Physical Exam   Physical Exam  Vitals and nursing note reviewed. Constitutional:       Appearance: He is well-developed. HENT:      Head: Normocephalic and atraumatic. Eyes:      General:         Right eye: No discharge. Left eye: No discharge. Conjunctiva/sclera: Conjunctivae normal.      Pupils: Pupils are equal, round, and reactive to light. Neck:      Trachea: No tracheal deviation. Cardiovascular:      Rate and Rhythm: Normal rate and regular rhythm. Heart sounds: Normal heart sounds. No murmur heard.       Pulmonary: Effort: Pulmonary effort is normal. No respiratory distress. Breath sounds: Normal breath sounds. No wheezing or rales. Abdominal:      General: Bowel sounds are normal.      Palpations: Abdomen is soft. Tenderness: There is no abdominal tenderness. There is no guarding or rebound. Musculoskeletal:         General: No tenderness or deformity. Normal range of motion. Cervical back: Normal range of motion and neck supple. Skin:     General: Skin is warm and dry. Findings: No erythema or rash. Neurological:      Mental Status: He is alert and oriented to person, place, and time. Psychiatric:         Behavior: Behavior normal.      Comments:  Denies SI or HI. Diagnostic Study Results     Labs -   No results found for this or any previous visit (from the past 12 hour(s)). Radiologic Studies -   No orders to display     CT Results  (Last 48 hours)    None        CXR Results  (Last 48 hours)    None            Medical Decision Making   I am the first provider for this patient. I reviewed the vital signs, available nursing notes, past medical history, past surgical history, family history and social history. Vital Signs-Reviewed the patient's vital signs. No data found. Records Reviewed:   Nursing notes, Prior visits     Provider Notes (Medical Decision Making):   No acute emergent conditions at this time, patient is not suicidal or homicidal, we evaluated the patient with be smart. They also do not feel the patient needs hospitalization. He has multiple recent emergency department visits. Most likely for secondary gain    ED Course:   Initial assessment performed. The patients presenting problems have been discussed, and they are in agreement with the care plan formulated and outlined with them. I have encouraged them to ask questions as they arise throughout their visit.                  Critical Care Time:   none    Disposition:    DISCHARGE NOTE  Patients results have been reviewed with them. Patient and/or family have verbally conveyed their understanding and agreement of the patient's signs, symptoms, diagnosis, treatment and prognosis and additionally agree to follow up as recommended or return to the Emergency Room should their condition change or have any new concerns prior to their follow-up appointment. Patient verbally agrees with the care-plan and verbally conveys that all of their questions have been answered. Discharge instructions have also been provided to the patient with some educational information regarding their diagnosis as well a list of reasons why they would want to return to the ER prior to their follow-up appointment should their condition change. PLAN:  1. Discharge Medication List as of 1/18/2022  8:03 PM        2. Follow-up Information     Follow up With Specialties Details Why [address] [place] [address] - [place] [facility] Emergency Medicine Schedule an appointment as soon as possible for a visit   Adriel Jackeline  [phone]          Return to ED if worse     Diagnosis     Clinical Impression:   1. Malingering    2. Homelessness        Attestations:   This note was completed by Tomas Licona DO

## 2022-01-19 NOTE — ED NOTES
ILYA stated patient was not being admitted to the hospital and we could set him up a cab ride to the [facility] on [facility] [facility].  ordering ride.

## 2023-05-14 RX ORDER — HYDROXYZINE 50 MG/1
TABLET, FILM COATED ORAL
COMMUNITY
Start: 2021-12-15

## 2023-05-14 RX ORDER — PRAVASTATIN SODIUM 80 MG/1
TABLET ORAL
COMMUNITY
Start: 2021-12-15

## 2023-05-14 RX ORDER — LITHIUM CARBONATE 450 MG
TABLET, EXTENDED RELEASE ORAL
COMMUNITY
Start: 2021-12-15

## 2023-05-14 RX ORDER — CITALOPRAM 10 MG/1
TABLET ORAL
COMMUNITY
Start: 2021-12-15

## 2023-05-14 RX ORDER — DIVALPROEX SODIUM 500 MG/1
TABLET, DELAYED RELEASE ORAL
COMMUNITY
Start: 2021-11-16

## 2023-05-14 RX ORDER — LISINOPRIL 10 MG/1
10 TABLET ORAL DAILY
COMMUNITY
Start: 2021-12-30

## 2023-05-14 RX ORDER — OXYBUTYNIN CHLORIDE 5 MG/1
TABLET, EXTENDED RELEASE ORAL
COMMUNITY
Start: 2021-12-15

## 2023-05-14 RX ORDER — AMLODIPINE BESYLATE 10 MG/1
TABLET ORAL
COMMUNITY
Start: 2021-12-15

## 2023-05-14 RX ORDER — TRAZODONE HYDROCHLORIDE 100 MG/1
100 TABLET ORAL NIGHTLY
COMMUNITY
Start: 2021-12-30